# Patient Record
(demographics unavailable — no encounter records)

---

## 2025-04-01 NOTE — ASSESSMENT
[FreeTextEntry1] : Impression: Sprain right ankle rule out occult fracture fibula.  Will discontinue splinting he will be allowed range of motion exercises massage ice along with progressive weightbearing as tolerated with his crutches.  No gym/sports until he returns in 2 weeks time for reevaluation

## 2025-04-01 NOTE — PHYSICAL EXAM
[FreeTextEntry1] : Exam today reveals he has mild to moderate swelling to the ankle he already has reasonable motion to the ankle and subtalar joint he is tender about the lateral ligaments mildly so about the lateral malleolus itself mild discomfort just above the medial malleolus where he did sustain some bruising as well.  The distal foot compartments and neurovascular exam are unremarkable.  Review of outside x-rays of the right ankle 3 views March 29, 2025 reveal question of a possibly nondisplaced fracture of the inferior pole of the fibula

## 2025-04-01 NOTE — HISTORY OF PRESENT ILLNESS
[FreeTextEntry1] : This 15-year-old healthy adolescent is seen for evaluation of his right ankle.  He was well until March 29 when he sustained injury playing soccer.  This precipitated pain swelling and limp.  He was seen at a local urgent care where x-rays were taken he was sent out in a splint.  He has been using crutches as well.  He is improving.  Prior to this no complaints past history is noncontributory

## 2025-04-16 NOTE — ASSESSMENT
[FreeTextEntry1] : Impression: Sprain right ankle much improved.  Will be allowed to return to gym and activities in 1 week return here as needed

## 2025-04-16 NOTE — PHYSICAL EXAM
[FreeTextEntry1] : His exam today he has no limp he has mild discomfort over the lateral ligaments none so about the malleolus no instability on stress with good motion to the ankle subtalar joint noted.  X-rays ordered and taken today of the right ankle 3 views reviewed interpreted by myself are negative

## 2025-04-16 NOTE — HISTORY OF PRESENT ILLNESS
[FreeTextEntry1] : This 15-year-old returns for follow-up of his right ankle injury.  He has improved significantly only minimal discomfort